# Patient Record
Sex: MALE
[De-identification: names, ages, dates, MRNs, and addresses within clinical notes are randomized per-mention and may not be internally consistent; named-entity substitution may affect disease eponyms.]

---

## 2019-03-17 ENCOUNTER — TRANSCRIPTION ENCOUNTER (OUTPATIENT)
Age: 64
End: 2019-03-17

## 2022-11-23 PROBLEM — Z00.00 ENCOUNTER FOR PREVENTIVE HEALTH EXAMINATION: Status: ACTIVE | Noted: 2022-11-23

## 2022-11-29 ENCOUNTER — APPOINTMENT (OUTPATIENT)
Dept: ORTHOPEDIC SURGERY | Facility: CLINIC | Age: 67
End: 2022-11-29

## 2022-11-29 VITALS
SYSTOLIC BLOOD PRESSURE: 154 MMHG | BODY MASS INDEX: 29.34 KG/M2 | HEIGHT: 75 IN | HEART RATE: 92 BPM | OXYGEN SATURATION: 98 % | DIASTOLIC BLOOD PRESSURE: 101 MMHG | WEIGHT: 236 LBS

## 2022-11-29 DIAGNOSIS — Z78.9 OTHER SPECIFIED HEALTH STATUS: ICD-10-CM

## 2022-11-29 DIAGNOSIS — Z72.0 TOBACCO USE: ICD-10-CM

## 2022-11-29 PROCEDURE — 73080 X-RAY EXAM OF ELBOW: CPT | Mod: LT

## 2022-11-29 PROCEDURE — 20610 DRAIN/INJ JOINT/BURSA W/O US: CPT | Mod: LT

## 2022-11-29 PROCEDURE — 99204 OFFICE O/P NEW MOD 45 MIN: CPT | Mod: 25

## 2022-12-01 NOTE — HISTORY OF PRESENT ILLNESS
[de-identified] : 67 year old male presents today with left elbow swelling x 3 weeks. Patient states that he leans a lot on the elbow. He has slight pain in his elbow when he leans on it and noticed  that bump has gotten bigger. He was seen at urgent care and elbow was aspirated on 11/16/22. Denies numbness or tingling. \par \par The patient's past medical history, past surgical history, medications and allergies were reviewed by me today with the patient and documented accordingly. In addition, the patient's family and social history, which were noncontributory to this visit, were reviewed also.

## 2022-12-01 NOTE — DISCUSSION/SUMMARY
[de-identified] : 68 y/o male with left olecranon bursitis. \par \par I discussed with the patient the treatment of olecranon bursitis. Discussed conservative management with aspiration of the olecranon bursa in an attempt to improve swelling acutely. Discussed risk of failure as well as possible risk of infection. Patient wished to proceed, and therapeutic aspiration performed today. Recommendations are for conservative management as below.\par \par Recommendation: Conservative care & observation, this includes rest/activity avoidance until less symptomatic, avoidance of direct pressure. Elbow pads. Patient may also use OTC NSAID's or acetaminophen as tolerated, with application of ice to the area 2-3x daily for 20 minutes after periods of activity.\par \par We will see the patient back as needed.

## 2022-12-01 NOTE — PHYSICAL EXAM
[de-identified] : Oriented to time, place, person\par Mood: Normal\par Affect: Normal\par Appearance: Healthy, well appearing, no acute distress.\par Gait: Normal\par Assistive Devices: None\par \par Left elbow exam\par \par Skin: Clean, dry, intact. No ecchymosis. No swelling. No palpable joint effusion.  Evidence of olecranon bursitis.\par ROM: Full extension/flexion, full supination/pronation.\par Painful ROM: None\par Tenderness: No medial epicondyle pain. No lateral epicondyle pain. No olecranon pain. No pain at radial head.\par Strength: 5/5 elbow flexion, 5/5 elbow extension, 5/5 supination, 5/5 pronation\par Stability: Stable to vaus/valgus stress\par Vasc: 2+ radial pulse, <2s cap refill\par Sensation: In tact to light touch throughout\par Neuro: Negative tinels at ulnar canal, AIN/PIN/Ulnar nerve in tact to motor/sensation  [de-identified] : The following radiographs were ordered and read by me during this patients visit. I reviewed each radiograph in detail with the patient and discussed the findings as highlighted below. \par \par 3 views of the left elbow were obtained today, 11/29/2022, that show no acute fracture or dislocation. There is no degenerative change seen. There is no gross malalignment. Evidence of olecranon bursitis.

## 2022-12-01 NOTE — ADDENDUM
[FreeTextEntry1] : This note was written by Susanne Patel on 11/29/2022 acting solely as a scribe for Dr. Kenroy Olivares.\par \par All medical record entries made by the Scribe were at my, Dr. Kenroy Olivares, direction and personally dictated by me on 11/29/2022. I have personally reviewed the chart and agree that the record accurately reflects my personal performance of the history, physical exam, assessment and plan.

## 2022-12-01 NOTE — PROCEDURE
[de-identified] : Aspiration: Left olecranon bursa\par Indication: Olecranon bursitis. \par \par A discussion was had with the patient regarding this procedure and all questions were answered. All risks, benefits and alternatives were discussed. These included but were not limited to bleeding, infection, and reaccumulation of fluid. Alcohol was used to clean the skin, and betadine was used to sterilize and prep the area in the posterior aspect of the left elbow. Ethyl chloride spray was then used as a topical anesthetic. An 18-gauge needle was used to aspirate the olecranon bursa and approximately 13cc of blood tinge inflammatory fluid was aspirated from the bursa without complication. A sterile compression bandage was then applied. The patient tolerated the procedure well.

## 2023-01-09 ENCOUNTER — APPOINTMENT (OUTPATIENT)
Dept: ORTHOPEDIC SURGERY | Facility: CLINIC | Age: 68
End: 2023-01-09
Payer: COMMERCIAL

## 2023-01-09 VITALS
TEMPERATURE: 97.9 F | BODY MASS INDEX: 29.59 KG/M2 | WEIGHT: 238 LBS | HEART RATE: 70 BPM | DIASTOLIC BLOOD PRESSURE: 88 MMHG | HEIGHT: 75 IN | OXYGEN SATURATION: 97 % | SYSTOLIC BLOOD PRESSURE: 138 MMHG

## 2023-01-09 DIAGNOSIS — M70.22 OLECRANON BURSITIS, LEFT ELBOW: ICD-10-CM

## 2023-01-09 PROCEDURE — 99213 OFFICE O/P EST LOW 20 MIN: CPT

## 2023-01-10 PROBLEM — M70.22 OLECRANON BURSITIS OF LEFT ELBOW: Status: ACTIVE | Noted: 2022-12-01

## 2023-01-10 NOTE — ADDENDUM
[FreeTextEntry1] : This note was written by Susanne Patel on 01/09/2023 acting solely as a scribe for Dr. Kenroy Olivares.\par \par All medical record entries made by the Scribe were at my, Dr. Kenroy Olivares, direction and personally dictated by me on 01/09/2023. I have personally reviewed the chart and agree that the record accurately reflects my personal performance of the history, physical exam, assessment and plan.

## 2023-01-10 NOTE — DISCUSSION/SUMMARY
[de-identified] : 66 y/o male with left olecranon bursitis. \par \par Patient presents with return of left olecranon bursitis, but that does not appear to be any significant fluid that is with aspirating again at this time.  Patient has some mild symptoms over the posterior aspect of the elbow from the inflamed bursal sac. We discussed that if he continues to have symptoms then he may be a candidate for possible surgical removal of the bursa. Patient will continue conservative management at this time and will follow-up if symptoms persists/worsen for possible surgical intervention. \par \par Recommendation: Continued avoidance of direct impact.  RICE/NSAIDs as needed.\par \par Follow-up as needed

## 2023-01-10 NOTE — PHYSICAL EXAM
[de-identified] : Oriented to time, place, person\par Mood: Normal\par Affect: Normal\par Appearance: Healthy, well appearing, no acute distress.\par Gait: Normal\par Assistive Devices: None\par \par Left elbow exam\par \par Skin: Clean, dry, intact. No ecchymosis. No swelling. No palpable joint effusion.  Evidence of olecranon bursitis, without significant fluid.\par ROM: Full extension/flexion, full supination/pronation.\par Painful ROM: None\par Tenderness: No medial epicondyle pain. No lateral epicondyle pain. No olecranon pain. No pain at radial head.\par Strength: 5/5 elbow flexion, 5/5 elbow extension, 5/5 supination, 5/5 pronation\par Stability: Stable to vaus/valgus stress\par Vasc: 2+ radial pulse, <2s cap refill\par Sensation: In tact to light touch throughout\par Neuro: Negative tinels at ulnar canal, AIN/PIN/Ulnar nerve in tact to motor/sensation  [de-identified] : 3 views of the left elbow were obtained 11/29/2022, that show no acute fracture or dislocation. There is no degenerative change seen. There is no gross malalignment. Evidence of olecranon bursitis.

## 2023-01-10 NOTE — HISTORY OF PRESENT ILLNESS
[de-identified] : 67 year old male presents today with return of  left elbow olecranon bursitis. His elbow has aspirated at last visit fluid returned a few weeks after, but has since again returned back to a normal level. Patient states that he leans a lot on the elbow. Swelling is not as bad as last visit.  He is her for further treatment.  Denies numbness or tingling. \par \par

## 2023-07-18 ENCOUNTER — APPOINTMENT (OUTPATIENT)
Dept: ORTHOPEDIC SURGERY | Facility: CLINIC | Age: 68
End: 2023-07-18
Payer: COMMERCIAL

## 2023-07-18 VITALS — WEIGHT: 230 LBS | BODY MASS INDEX: 28.6 KG/M2 | HEIGHT: 75 IN

## 2023-07-18 DIAGNOSIS — M25.562 PAIN IN LEFT KNEE: ICD-10-CM

## 2023-07-18 PROCEDURE — 99214 OFFICE O/P EST MOD 30 MIN: CPT | Mod: 25

## 2023-07-18 PROCEDURE — 73564 X-RAY EXAM KNEE 4 OR MORE: CPT | Mod: LT

## 2023-07-18 PROCEDURE — 20610 DRAIN/INJ JOINT/BURSA W/O US: CPT | Mod: LT

## 2023-07-28 PROBLEM — M25.562 KNEE PAIN, LEFT: Status: ACTIVE | Noted: 2023-07-28

## 2023-07-28 NOTE — PROCEDURE
[de-identified] : Aspiration & Injection: Left knee joint.\par Indication: Effusion \par \par A discussion was had with the patient regarding this procedure and all questions were answered. All risks, benefits and alternatives were discussed. These included but were not limited to bleeding, infection, allergic reaction and reaccumulation of fluid. Alcohol was used to clean the skin, and betadine was used to sterilize and prep the area in the supero-lateral aspect of the left knee. Ethyl chloride spray was then used as a topical anesthetic. An 18-gauge needle was used to aspirate the knee joint and approximately 21 cc of inflammatory fluid was aspirated from the left knee without complication. In addition, following the aspiration, an injection of 4cc of 1% lidocaine and 1cc of 40mg/ml methylprednisolone also inserted into the knee via the same needle. A sterile bandage was then applied. The patient tolerated the procedure well.

## 2023-07-28 NOTE — ADDENDUM
[FreeTextEntry1] : This note was written by Susanne Patel on 07/18/2023 acting solely as a scribe for Dr. Kenroy Olivares.\par \par All medical record entries made by the Scribe were at my, Dr. Kenroy Olivares, direction and personally dictated by me on 07/18/2023. I have personally reviewed the chart and agree that the record accurately reflects my personal performance of the history, physical exam, assessment and plan.

## 2023-07-28 NOTE — PHYSICAL EXAM
[de-identified] : Oriented to time, place, person\par Mood: Normal\par Affect: Normal\par Appearance: Healthy, well appearing, no acute distress.\par Gait: Normal\par Assistive Devices: None\par \par Left Knee Exam:\par \par Skin: Clean, dry, intact\par Inspection: No obvious malalignment, no masses, mild swelling, +mild effusion\par Pulses: 2+ DP/PT pulses \par ROM: 0-135 degrees of flexion. No pain with deep knee flexion/extension.\par Tenderness: No MJLT. No LJLT. No pain over the patella facets. No pain to the quadriceps tendon. No pain to the patella tendon. No posterior knee tenderness.\par Stability: Stable to varus, valgus. Negative Lachman testing. Negative anterior drawer, negative posterior drawer.\par Strength: 5/5 Q/H/TA/GS/EHL, without atrophy\par Neuro: Intact to light touch throughout, DTRs normal\par Additional Tests: Negative Britton's test, Negative patellar grind test  [de-identified] : The following radiographs were ordered and read by me during this patients visit. I reviewed each radiograph in detail with the patient and discussed the findings as highlighted below. \par \par 4 views of the left knee were obtained today, 07/18/2023, that show no acute fracture or dislocation. There is no medial, no lateral and trace patellofemoral degenerative changes seen. There is no significant malalignment. No significant other obvious osseous abnormality, otherwise unremarkable.

## 2023-07-28 NOTE — DISCUSSION/SUMMARY
[de-identified] : 68 y/o male with left knee pain. 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 Described that this is likely due to overuse, and age-related "wear and tear". Pain may be related to breakdown of the chondral surfaces, cartilage, or ligaments of the knee. Patient was given aspiration and injection therapy for therapeutic and symptomatic relief of current effusion. \par \par Recommendation: Conservative care & observation; including rest/activity avoidance until less symptomatic with subsequent gradual return to full low impact activity as tolerated. Patient may also use OTC NSAIDs or acetaminophen as tolerated, with application of ice/heat to the area 2-3x daily for 20 minutes after periods of activity. \par \par Follow up as needed if pain persists for further evaluation and treatment.

## 2023-12-04 NOTE — HISTORY OF PRESENT ILLNESS
Did he take a COVID test?
I did tell him to take one. Have not heard back with results.  
Patient has had cold symptoms for several weeks. He would like to come in to make sure it is not becoming pneumonia. You do not have any appointments available until next week. Do you want me to squeeze him in before then?  
[de-identified] : 67 year old male presents today with left knee pain x 6-9 months. No injury reported. The pain is constant worse with walking, and standing. He is taking Aleve for pain. C/o swelling. Denies prior knee surgery.  Denies catching, locking, numbness or tingling. He had his Baker's cyst  aspirated 2 months ago with good relief but swelling has returned.